# Patient Record
Sex: FEMALE | Race: WHITE | URBAN - METROPOLITAN AREA
[De-identification: names, ages, dates, MRNs, and addresses within clinical notes are randomized per-mention and may not be internally consistent; named-entity substitution may affect disease eponyms.]

---

## 2018-09-19 ENCOUNTER — OFFICE VISIT (OUTPATIENT)
Dept: DERMATOLOGY | Facility: CLINIC | Age: 23
End: 2018-09-19
Payer: COMMERCIAL

## 2018-09-19 DIAGNOSIS — L20.89 FLEXURAL ATOPIC DERMATITIS: Primary | ICD-10-CM

## 2018-09-19 RX ORDER — BETAMETHASONE DIPROPIONATE 0.05 %
OINTMENT (GRAM) TOPICAL 2 TIMES DAILY
Qty: 50 G | Refills: 1 | Status: SHIPPED | OUTPATIENT
Start: 2018-09-19

## 2018-09-19 RX ORDER — TACROLIMUS 1 MG/G
OINTMENT TOPICAL 2 TIMES DAILY
Qty: 60 G | Refills: 1 | Status: SHIPPED | OUTPATIENT
Start: 2018-09-19 | End: 2018-11-14

## 2018-09-19 RX ORDER — TRIAMCINOLONE ACETONIDE 1 MG/G
OINTMENT TOPICAL
COMMUNITY
Start: 2018-05-21

## 2018-09-19 ASSESSMENT — PAIN SCALES - GENERAL: PAINLEVEL: NO PAIN (0)

## 2018-09-19 NOTE — NURSING NOTE
Dermatology Rooming Note    Adelaida Phelps's goals for this visit include:   Chief Complaint   Patient presents with     Derm Problem     Janet is here today to be seen for excema.      Lois Patel MA

## 2018-09-19 NOTE — MR AVS SNAPSHOT
After Visit Summary   2018    Adelaida Phelps    MRN: [id number]           Patient Information     Date Of Birth          1995        Visit Information        Provider Department      2018 12:00 PM Nila Wright PA-C [facility]        Today's Diagnoses     Flexural atopic dermatitis    -  1       Follow-ups after your visit        Your next 10 appointments already scheduled     Sep 19, 2018 12:00 PM CDT   (Arrive by 11:45 AM)   [facility] Visit with [facility] ([facility])    [address] [place] [id number] [phone]            2018 10:45 AM CST   (Arrive by 10:30 AM)   Return Visit with [facility] ([facility])    [address] [place] [id number] [phone]              Who to contact     Please call your clinic at [phone] to:    Ask questions about your health    Make or cancel appointments    Discuss your medicines    Learn about your test results    Speak to your doctor            Additional Information About Your Visit        MyChart Information     [employer] is an electronic gateway that provides easy, online access to your medical records. With [employer], you can request a clinic appointment, read your test results, renew a prescription or communicate with your care team.     To sign up for Webchutne[employer] visit the website at [email or web address]   You will be asked to enter the access code listed below, as well as some personal information. Please follow the directions to create your username and password.     Your access code is: [email or web address]  Expires: 2018 10:54 AM     Your access code will  in 90 days. If you need help or a new code, please contact your [facility] or call [phone] for assistance.        Care EveryWhere ID     This  is your Care EveryWhere ID. This could be used by other organizations to access your [facility] medical records  [id number]         Blood Pressure from Last 3 Encounters:   No data found for BP    Weight from Last 3 Encounters:   No data found for Wt              Today, you had the following     No orders found for display         Today's Medication Changes          These changes are accurate as of 9/19/18 11:40 AM.  If you have any questions, ask your nurse or doctor.               Start taking these medicines.        Dose/Directions    betamethasone dipropionate 0.05 % ointment   Commonly known as:  DIPROSONE   Used for:  Flexural atopic dermatitis   Started by:  Nila Wright PA-C        Apply topically 2 times daily   Quantity:  50 g   Refills:  1       tacrolimus 0.1 % ointment   Commonly known as:  PROTOPIC   Used for:  Flexural atopic dermatitis   Started by:  Nila Wright PA-C        Apply topically 2 times daily   Quantity:  60 g   Refills:  1            Where to get your medicines      These medications were sent to [address] [address]     Phone:  [phone]     betamethasone dipropionate 0.05 % ointment    tacrolimus 0.1 % ointment                Primary Care Provider    None Specified       No primary provider on file.        Equal Access to Services     ELO CRESPO : Donny tamayo Sosonja, wawilliamda lunatashaadaha, qaybta kaalmada adeegyada, yudelka kinney. So [facility] [phone].    ATENCIÓN: Si habla español, tiene a cole disposición servicios gratuitos de asistencia lingüística. Llame al [phone].    We comply with applicable federal civil rights laws and [place] laws. We do not discriminate on the basis of race, color, national origin, age, disability, sex, sexual orientation, or gender identity.            Thank you!     Thank you for choosing [facility]  for your  care. Our goal is always to provide you with excellent care. Hearing back from our patients is one way we can continue to improve our services. Please take a few minutes to complete the written survey that you may receive in the mail after your visit with us. Thank you!             Your Updated Medication List - Protect others around you: Learn how to safely use, store and throw away your medicines at www.disposemymeds.org.          This list is accurate as of 9/19/18 11:40 AM.  Always use your most recent med list.                   Brand Name Dispense Instructions for use Diagnosis    betamethasone dipropionate 0.05 % ointment    DIPROSONE    50 g    Apply topically 2 times daily    Flexural atopic dermatitis       levonorgestrel 20 MCG/24HR IUD    MIRENA     1 each by Intrauterine route once        tacrolimus 0.1 % ointment    PROTOPIC    60 g    Apply topically 2 times daily    Flexural atopic dermatitis       triamcinolone 0.1 % ointment    KENALOG

## 2018-09-19 NOTE — LETTER
Date:September 21, 2018      Patient was self referred, no letter generated. Do not send.        [facility] Information

## 2018-09-19 NOTE — LETTER
9/19/2018       RE: Adelaida Phelps  [id number]  [place] [address]     Dear Colleague,    Thank you for referring your patient, Adelaida Phelps, to the [facility] DER[facility] at [facility]. Please see a copy of my visit note below.    [facility] Dermatology Note      Dermatology Problem List:  1. Atopic dermatitis - tacrolimus 0.1% ointment, betamethasone dipropionate 0.05% ointment, triamcinolone 0.1% ointment, hydrocortisone 1% cream OTC  - used in various areas on the body    Encounter Date: Sep 19, 2018    CC:  Chief Complaint   Patient presents with     Derm Problem     Janet is here today to be seen for excema.          History of Present Illness:  Ms. Adelaida Phelps is a 23 year old female who presents today for eczema evaluation. This is her first visit in our dermatology clinic. She was diagnosed with eczema at [place] a while ago. She was given triamcinolone in May. She feels like this helps, but does not completely resolve her more persistent patches. She has been dealing with this since childhood mostly on the elbows and behind the knees. Last December it started flaring up worse in areas it never showed up before, like on the tops of her hands. She does feel like sweat aggravates her skin. For skin care, she uses a Dove sensitive skin soap only on areas of her body that need it. She applies lotion after the shower. She only uses triamcinolone during flare ups. Denies history of asthma. She does have seasonal allergies. Denies history of eczema in her immediate family. She is otherwise healthy. 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       Past Medical History:   There is no problem list on file for this patient.    No past medical history on file.  No past surgical history on file.    Social History:  Social History     Social History     Marital status: Single     Spouse name: N/A     Number of children: N/A     Years of  education: N/A     Social History Main Topics     Smoking status: Not on file     Smokeless tobacco: Not on file     Alcohol use Not on file     Drug use: Not on file     Sexual activity: Not on file     Other Topics Concern     Not on file     Social History Narrative       Family History:  No family history on file.    Medications:  No current outpatient prescriptions on file.       Allergies   Allergen Reactions     Cats        Review of Systems:  -Skin Establ Pt: The patient denies any new rash, pruritus, or lesions that are symptomatic, changing or bleeding, except as per HPI.  -Constitutional: The patient is feeling generally well.  -Allergy: allergic to cats, mild hx of seasonal rhinitis  -Pulm: no hx asthma    Physical exam:  Vitals: There were no vitals taken for this visit.  GEN: This is a well developed, well-nourished female in no acute distress, in a pleasant mood.    SKIN: Total skin excluding the undergarment areas was performed. The exam included the head/face, neck, both arms, chest, back, abdomen, both legs, digits and/or nails.   - Mild erythema and scaly patches on the bilateral antecubital fossa, bilateral popliteal fossae, right thenar eminence, right wrist, and right third finger, right fourth finger, bilateral dorsal hand, and left fourth finger.  - Similar erythematous patches on the neck.    - No other lesions of concern on areas examined.       Impression/Plan:  1. Atopic dermatitis     Bacterial culture taken from right popliteal fossa.     Continue triamcinolone 0.1% ointment.     Start betamethasone dipropionate 0.05% ointment - apply topically BID during flares to elbows, knees, and hands and forearms - use if TAC is not enough.    Start tacrolimus 0.1% ointment - apply topically BID when not flared. Use this intermittently between doses of topical corticosteroids to help with steroid sparing    Discussed various strengths of steroids/topicals and which areas of the body they are best  for.    Recommended occlusion with saran wrap to help medications penetrate further on stubborn areas like popliteal and antecubital fossae.     Emphasized importance of moisturizing daily, up to TID, specifically Cetaphil/Cerave creams. Cold mist humidifiers were also recommended to keep skin moisturized.      Discussed possibility of starting NB-UVB therapy as next step if skin continue to get worse.       Follow-up in 2 months, earlier for new or changing lesions.      Staff Involved:    Scribe Disclosure  I, Monster Sams, am serving as a scribe to document services personally performed by Nila Wright PA-C, based on data collection and the provider's statements to me.     Provider Disclosure:   The documentation recorded by the scribe accurately reflects the services I personally performed and the decisions made by me.    All risks, benefits and alternatives were discussed with patient.  Patient is in agreement and understands the assessment and plan.  All questions were answered.    Nila Wright PA-C  [facility]: Phone: [phone], Fax: [phone]        Again, thank you for allowing me to participate in the care of your patient.      Sincerely,    Nila Wright PA-C

## 2018-09-19 NOTE — PROGRESS NOTES
[facility] Dermatology Note      Dermatology Problem List:  1. Atopic dermatitis - tacrolimus 0.1% ointment, betamethasone dipropionate 0.05% ointment, triamcinolone 0.1% ointment, hydrocortisone 1% cream OTC  - used in various areas on the body    Encounter Date: Sep 19, 2018    CC:  Chief Complaint   Patient presents with     Derm Problem     Janet is here today to be seen for excema.          History of Present Illness:  Ms. Adelaida Phelps is a 23 year old female who presents today for eczema evaluation. This is her first visit in our dermatology clinic. She was diagnosed with eczema at [place] a while ago. She was given triamcinolone in May. She feels like this helps, but does not completely resolve her more persistent patches. She has been dealing with this since childhood mostly on the elbows and behind the knees. Last December it started flaring up worse in areas it never showed up before, like on the tops of her hands. She does feel like sweat aggravates her skin. For skin care, she uses a Dove sensitive skin soap only on areas of her body that need it. She applies lotion after the shower. She only uses triamcinolone during flare ups. Denies history of asthma. She does have seasonal allergies. Denies history of eczema in her immediate family. She is otherwise healthy. She is a chemistry student at the  of .       Past Medical History:   There is no problem list on file for this patient.    No past medical history on file.  No past surgical history on file.    Social History:  Social History     Social History     Marital status: Single     Spouse name: N/A     Number of children: N/A     Years of education: N/A     Social History Main Topics     Smoking status: Not on file     Smokeless tobacco: Not on file     Alcohol use Not on file     Drug use: Not on file     Sexual activity: Not on file     Other Topics Concern     Not on file     Social History Narrative       Family  History:  No family history on file.    Medications:  No current outpatient prescriptions on file.       Allergies   Allergen Reactions     Cats        Review of Systems:  -Skin Establ Pt: The patient denies any new rash, pruritus, or lesions that are symptomatic, changing or bleeding, except as per HPI.  -Constitutional: The patient is feeling generally well.  -Allergy: allergic to cats, mild hx of seasonal rhinitis  -Pulm: no hx asthma    Physical exam:  Vitals: There were no vitals taken for this visit.  GEN: This is a well developed, well-nourished female in no acute distress, in a pleasant mood.    SKIN: Total skin excluding the undergarment areas was performed. The exam included the head/face, neck, both arms, chest, back, abdomen, both legs, digits and/or nails.   - Mild erythema and scaly patches on the bilateral antecubital fossa, bilateral popliteal fossae, right thenar eminence, right wrist, and right third finger, right fourth finger, bilateral dorsal hand, and left fourth finger.  - Similar erythematous patches on the neck.    - No other lesions of concern on areas examined.       Impression/Plan:  1. Atopic dermatitis     Bacterial culture taken from right popliteal fossa.     Continue triamcinolone 0.1% ointment.     Start betamethasone dipropionate 0.05% ointment - apply topically BID during flares to elbows, knees, and hands and forearms - use if TAC is not enough.    Start tacrolimus 0.1% ointment - apply topically BID when not flared. Use this intermittently between doses of topical corticosteroids to help with steroid sparing    Discussed various strengths of steroids/topicals and which areas of the body they are best for.    Recommended occlusion with saran wrap to help medications penetrate further on stubborn areas like popliteal and antecubital fossae.     Emphasized importance of moisturizing daily, up to TID, specifically Cetaphil/Cerave creams. Cold mist humidifiers were also recommended  to keep skin moisturized.      Discussed possibility of starting NB-UVB therapy as next step if skin continue to get worse.       Follow-up in 2 months, earlier for new or changing lesions.      Staff Involved:    Scribe Disclosure  I, Monster Sams, am serving as a scribe to document services personally performed by Nila Wright PA-C, based on data collection and the provider's statements to me.     Provider Disclosure:   The documentation recorded by the scribe accurately reflects the services I personally performed and the decisions made by me.    All risks, benefits and alternatives were discussed with patient.  Patient is in agreement and understands the assessment and plan.  All questions were answered.    Nila Wright PA-C  [facility]: Phone: [phone], Fax: [phone]

## 2018-09-22 LAB
BACTERIA SPEC CULT: ABNORMAL
BACTERIA SPEC CULT: ABNORMAL
Lab: ABNORMAL
SPECIMEN SOURCE: ABNORMAL

## 2018-09-25 DIAGNOSIS — B96.89 SUPERFICIAL BACTERIAL SKIN INFECTION: Primary | ICD-10-CM

## 2018-09-25 DIAGNOSIS — L08.9 SUPERFICIAL BACTERIAL SKIN INFECTION: Primary | ICD-10-CM

## 2018-09-25 RX ORDER — CLINDAMYCIN PHOSPHATE 10 UG/ML
LOTION TOPICAL 2 TIMES DAILY
Qty: 60 ML | Refills: 1 | Status: SHIPPED | OUTPATIENT
Start: 2018-09-25

## 2018-09-26 ENCOUNTER — HEALTH MAINTENANCE LETTER (OUTPATIENT)
Age: 23
End: 2018-09-26

## 2018-09-26 DIAGNOSIS — L20.89 FLEXURAL ATOPIC DERMATITIS: Primary | ICD-10-CM

## 2018-09-26 RX ORDER — PIMECROLIMUS 10 MG/G
CREAM TOPICAL 2 TIMES DAILY
Qty: 60 G | Refills: 1 | Status: SHIPPED | OUTPATIENT
Start: 2018-09-26 | End: 2018-11-14

## 2018-11-14 ENCOUNTER — OFFICE VISIT (OUTPATIENT)
Dept: DERMATOLOGY | Facility: CLINIC | Age: 23
End: 2018-11-14
Payer: COMMERCIAL

## 2018-11-14 DIAGNOSIS — L20.89 FLEXURAL ATOPIC DERMATITIS: Primary | ICD-10-CM

## 2018-11-14 ASSESSMENT — PAIN SCALES - GENERAL: PAINLEVEL: MILD PAIN (2)

## 2018-11-14 NOTE — MR AVS SNAPSHOT
After Visit Summary   11/14/2018    Adelaida Phelps    MRN: [id number]           Patient Information     Date Of Birth          1995        Visit Information        Provider Department      11/14/2018 10:45 AM Nila Wright PA-C [facility]        Today's Diagnoses     Flexural atopic dermatitis    -  1       Follow-ups after your visit        Follow-up notes from your care team     Return in about 6 weeks (around 12/26/2018).      Future tests that were ordered for you today     Open Standing Orders        Priority Remaining Interval Expires Ordered    PROCEDURE - PHOTOTHERAPY NBUVB Routine 99/99  11/14/2019 11/14/2018            Who to contact     Please call your clinic at [phone] to:    Ask questions about your health    Make or cancel appointments    Discuss your medicines    Learn about your test results    Speak to your doctor            Additional Information About Your Visit        MyChart Information     [employer] gives you secure access to your electronic health record. If you see a primary care provider, you can also send messages to your care team and make appointments. If you have questions, please call your primary care clinic.  If you do not have a primary care provider, please call [phone] and they will assist you.      [employer] is an electronic gateway that provides easy, online access to your medical records. With [employer], you can request a clinic appointment, read your test results, renew a prescription or communicate with your care team.     To access your existing account, please contact your [facility] or call [phone] for assistance.        Care EveryWhere ID     This is your Care EveryWhere ID. This could be used by other organizations to access your [facility] medical records  [id number]         Blood Pressure from Last 3 Encounters:   No data found for BP    Weight from Last 3 Encounters:   No data found for Wt                Primary Care Provider    None Specified       No primary provider on file.        Equal Access to Services     ELO CRESPO : Hadii aad ku hadsandranatanael Bass, catalina alexander, chasreina lozanomayudelka hugginssandermalina kinney. So [facility] [phone].    ATENCIÓN: Si habla español, tiene a cole disposición servicios gratuitos de asistencia lingüística. Llame al [phone].    We comply with applicable federal civil rights laws and [place] laws. We do not discriminate on the basis of race, color, national origin, age, disability, sex, sexual orientation, or gender identity.            Thank you!     Thank you for choosing [facility] DERMAT[facility]  for your care. Our goal is always to provide you with excellent care. Hearing back from our patients is one way we can continue to improve our services. Please take a few minutes to complete the written survey that you may receive in the mail after your visit with us. Thank you!             Your Updated Medication List - Protect others around you: Learn how to safely use, store and throw away your medicines at www.disposemymeds.org.          This list is accurate as of 11/14/18  3:59 PM.  Always use your most recent med list.                   Brand Name Dispense Instructions for use Diagnosis    betamethasone dipropionate 0.05 % ointment    DIPROSONE    50 g    Apply topically 2 times daily    Flexural atopic dermatitis       clindamycin 1 % lotion    CLEOCIN T    60 mL    Apply topically 2 times daily    Superficial bacterial skin infection       levonorgestrel 20 MCG/24HR IUD    MIRENA     1 each by Intrauterine route once        triamcinolone 0.1 % ointment    KENALOG

## 2018-11-14 NOTE — NURSING NOTE
"Dermatology Rooming Note    Adelaida Phelps's goals for this visit include:   Chief Complaint   Patient presents with     Derm Problem     Adelaida is here today for an eczema follow vamshi Son states \"it's coming back\".      Lois Patel, RMSHELIA     "

## 2018-11-14 NOTE — LETTER
"11/14/2018       RE: Adelaida Phelps  [address]  [facility] [address]     Dear Colleague,    Thank you for referring your patient, Adelaida Phelps, to the [facility] DERMATOLOGY at [facility]. Please see a copy of my visit note below.    [facility] Dermatology Note      Dermatology Problem List:  1. Atopic dermatitis - Start NB-UVB therapy, if insurance does not cover treatment then continue with betamethasone dipropionate 0.05% ointment, triamcinolone 0.1% ointment, hydrocortisone 1% cream OTC  - used in various areas on the   -Hold clindamycin for now      Encounter Date: Nov 14, 2018    CC:  Chief Complaint   Patient presents with     Derm Problem     Adelaida is here today for an eczema follow u- Adelaida states \"it's coming back\".          History of Present Illness:  Ms. Adelaida Phelps is a 23 year old female who presents as a follow-up for atopic dermatisis. The patient was last seen 9/19/18 when it was recommended she start tacrolimus 0.1% ointment, Betamethasone dipropionate 0.05% ointment, triamcinolone 0.1% ointment, and Hydrocotisone 1% cream. At today's visit the patient reports that her atopic dermatis has been slightly better, but it fluctuates. The patient has only been using the clindamycin and topical steroids. Due to insurance the patient was not able to purchase the protopic. The patient has been using dove gentle skin bar soap. The patient denies painful, itching, tingling or bleeding lesions unless otherwise noted.    Past Medical History:   There is no problem list on file for this patient.    History reviewed. No pertinent past medical history.  History reviewed. No pertinent surgical history.    Social History:   reports that she has never smoked. She has never used smokeless tobacco.    Family History:  History reviewed. No pertinent family history.    Medications:  Current Outpatient Prescriptions   Medication Sig Dispense " Refill     betamethasone dipropionate (DIPROSONE) 0.05 % ointment Apply topically 2 times daily 50 g 1     clindamycin (CLEOCIN T) 1 % lotion Apply topically 2 times daily 60 mL 1     levonorgestrel (MIRENA) 20 MCG/24HR IUD 1 each by Intrauterine route once       pimecrolimus (ELIDEL) 1 % cream Apply topically 2 times daily 60 g 1     tacrolimus (PROTOPIC) 0.1 % ointment Apply topically 2 times daily 60 g 1     triamcinolone (KENALOG) 0.1 % ointment          Allergies   Allergen Reactions     Cats        Review of Systems:  -Constitutional: The patient denies fatigue, fevers, chills, unintended weight loss, and night sweats.  -Skin: As above in HPI. No additional skin concerns.  -Allergy: allergic to cats, mild hx of seasonal rhinitis  -Pulm: no hx asthma    Physical exam:  Vitals: There were no vitals taken for this visit.  GEN: This is a well developed, well-nourished female in no acute distress, in a pleasant mood.    SKIN: Total skin excluding the undergarment areas was performed. The exam included the head/face, neck, both arms, chest, back, abdomen, both legs, digits and/or nails.   -There are numerous pink scaly patches and plaques on the bilateral forearms, chest, neck, stomach, back, hands, and legs. Face and scalp spared.  -No other lesions of concern on areas examined.     Impression/Plan:  1. Atopic Dermatis     Emphasized importance of moisturizing daily, up to TID, specifically Cetaphil/Cerave creams. Cold mist humidifiers were also recommended to keep skin moisturized.      Discussed MOA of NB-UVB therapy    Discussed the side effects and risks of NB-UVB therapy, consent signed     Starting NB-UVB therapy, will check if insurance will cover treatment prior to initiating     Hold Clindamycin 1 % lotion, apply topically 2 times daily, if the patient sees any areas that are getting infected then apply to specific areas.     Continue betamethasone dipropionate 0.05% ointment - apply topically BID during  flares to elbows, knees, and hands and forearms - use if TAC is not enough.    Continue tacrolimus 0.1% ointment - apply topically BID when not flared. Use this intermittently between doses of topical corticosteroids to help with steroid sparing  Will start phototherapy(nbUVB) protocol pending insurance approval, two to three times weekly, increasing dose as tolerated. Will initiate tx at 100mJ.  Maximum dose: 2000mJ  Consent form signed today. Medication list reviewed and no photosensitizing medication was noted.  Discussed all risks and side effects of phototherapy with patient, including burning, a worsening of her condition, scarring, pain, eye damage, photoaging and risk of skin cancer.  Will recheck patient in 4 weeks following initiating phototherpay     CC Dr. Alonso on close of this encounter.  Follow-up ~6weeks for new or changing lesions.       Staff Involved:    Scribe Disclosure  I, Binh Hollingsworth, am serving as a scribe to document services personally performed by Nila Wright PA-C, based on data collection and the provider's statements to me.     Provider Disclosure:   The documentation recorded by the scribe accurately reflects the services I personally performed and the decisions made by me.    All risks, benefits and alternatives were discussed with patient.  Patient is in agreement and understands the assessment and plan.  All questions were answered.    Nila Wright PA-C  [facility]: Phone: [phone], Fax: [phone]                    Again, thank you for allowing me to participate in the care of your patient.      Sincerely,    Nila Wright PA-C

## 2018-11-14 NOTE — PROGRESS NOTES
"[facility] Dermatology Note      Dermatology Problem List:  1. Atopic dermatitis - Start NB-UVB therapy, if insurance does not cover treatment then continue with betamethasone dipropionate 0.05% ointment, triamcinolone 0.1% ointment, hydrocortisone 1% cream OTC  - used in various areas on the   -Hold clindamycin for now      Encounter Date: Nov 14, 2018    CC:  Chief Complaint   Patient presents with     Derm Problem     Adelaida is here today for an eczema follow u- Adelaida states \"it's coming back\".          History of Present Illness:  Ms. Adelaida Phelps is a 23 year old female who presents as a follow-up for atopic dermatisis. The patient was last seen 9/19/18 when it was recommended she start tacrolimus 0.1% ointment, Betamethasone dipropionate 0.05% ointment, triamcinolone 0.1% ointment, and Hydrocotisone 1% cream. At today's visit the patient reports that her atopic dermatis has been slightly better, but it fluctuates. The patient has only been using the clindamycin and topical steroids. Due to insurance the patient was not able to purchase the protopic. The patient has been using dove gentle skin bar soap. The patient denies painful, itching, tingling or bleeding lesions unless otherwise noted.    Past Medical History:   There is no problem list on file for this patient.    History reviewed. No pertinent past medical history.  History reviewed. No pertinent surgical history.    Social History:   reports that she has never smoked. She has never used smokeless tobacco.    Family History:  History reviewed. No pertinent family history.    Medications:  Current Outpatient Prescriptions   Medication Sig Dispense Refill     betamethasone dipropionate (DIPROSONE) 0.05 % ointment Apply topically 2 times daily 50 g 1     clindamycin (CLEOCIN T) 1 % lotion Apply topically 2 times daily 60 mL 1     levonorgestrel (MIRENA) 20 MCG/24HR IUD 1 each by Intrauterine route once       pimecrolimus (ELIDEL) 1 " % cream Apply topically 2 times daily 60 g 1     tacrolimus (PROTOPIC) 0.1 % ointment Apply topically 2 times daily 60 g 1     triamcinolone (KENALOG) 0.1 % ointment          Allergies   Allergen Reactions     Cats        Review of Systems:  -Constitutional: The patient denies fatigue, fevers, chills, unintended weight loss, and night sweats.  -Skin: As above in HPI. No additional skin concerns.  -Allergy: allergic to cats, mild hx of seasonal rhinitis  -Pulm: no hx asthma    Physical exam:  Vitals: There were no vitals taken for this visit.  GEN: This is a well developed, well-nourished female in no acute distress, in a pleasant mood.    SKIN: Total skin excluding the undergarment areas was performed. The exam included the head/face, neck, both arms, chest, back, abdomen, both legs, digits and/or nails.   -There are numerous pink scaly patches and plaques on the bilateral forearms, chest, neck, stomach, back, hands, and legs. Face and scalp spared.  -No other lesions of concern on areas examined.     Impression/Plan:  1. Atopic Dermatis     Emphasized importance of moisturizing daily, up to TID, specifically Cetaphil/Cerave creams. Cold mist humidifiers were also recommended to keep skin moisturized.      Discussed MOA of NB-UVB therapy    Discussed the side effects and risks of NB-UVB therapy, consent signed     Starting NB-UVB therapy, will check if insurance will cover treatment prior to initiating     Hold Clindamycin 1 % lotion, apply topically 2 times daily, if the patient sees any areas that are getting infected then apply to specific areas.     Continue betamethasone dipropionate 0.05% ointment - apply topically BID during flares to elbows, knees, and hands and forearms - use if TAC is not enough.    Continue tacrolimus 0.1% ointment - apply topically BID when not flared. Use this intermittently between doses of topical corticosteroids to help with steroid sparing  Will start phototherapy(nbUVB) protocol  pending insurance approval, two to three times weekly, increasing dose as tolerated. Will initiate tx at 100mJ.  Maximum dose: 2000mJ  Consent form signed today. Medication list reviewed and no photosensitizing medication was noted.  Discussed all risks and side effects of phototherapy with patient, including burning, a worsening of her condition, scarring, pain, eye damage, photoaging and risk of skin cancer.  Will recheck patient in 4 weeks following initiating phototherpay     CC Dr. Alonso on close of this encounter.  Follow-up ~6weeks for new or changing lesions.       Staff Involved:    Scribe Disclosure  I, Binh Hollingsworth, am serving as a scribe to document services personally performed by Nila Wright PA-C, based on data collection and the provider's statements to me.     Provider Disclosure:   The documentation recorded by the scribe accurately reflects the services I personally performed and the decisions made by me.    All risks, benefits and alternatives were discussed with patient.  Patient is in agreement and understands the assessment and plan.  All questions were answered.    Nila Wright PA-C  [facility]: Phone: [phone], Fax: [phone]

## 2018-11-14 NOTE — LETTER
Date:November 15, 2018      Patient was self referred, no letter generated. Do not send.        [facility] Health Information

## 2018-11-19 ENCOUNTER — ALLIED HEALTH/NURSE VISIT (OUTPATIENT)
Dept: DERMATOLOGY | Facility: CLINIC | Age: 23
End: 2018-11-19
Payer: COMMERCIAL

## 2018-11-19 DIAGNOSIS — L20.89 FLEXURAL ATOPIC DERMATITIS: ICD-10-CM

## 2018-11-19 NOTE — MR AVS SNAPSHOT
After Visit Summary   11/19/2018    Adelaida Phelps    MRN: [id number]           Patient Information     Date Of Birth          1995        Visit Information        Provider Department      11/19/2018 11:00 AM [facility] DERM LIGHT TREATMENT [facility] Dermatology        Today's Diagnoses     Flexural atopic dermatitis           Follow-ups after your visit        Your next 10 appointments already scheduled     Nov 26, 2018 11:00 AM CST   (Arrive by 10:45 AM)   Light Treatment Visit with [facility] DERM LIGHT TREATMENT   [facility] Dermatology ([facility])    [address] [address] [phone]              Who to contact     Please call your clinic at [phone] to:    Ask questions about your health    Make or cancel appointments    Discuss your medicines    Learn about your test results    Speak to your doctor            Additional Information About Your Visit        [employer] Information     [employer] gives you secure access to your electronic health record. If you see a primary care provider, you can also send messages to your care team and make appointments. If you have questions, please call your primary care clinic.  If you do not have a primary care provider, please call [phone] and they will assist you.      [employer] is an electronic gateway that provides easy, online access to your medical records. With [employer], you can request a clinic appointment, read your test results, renew a prescription or communicate with your care team.     To access your existing account, please contact your [facility] or call [phone] for assistance.        Care EveryWhere ID     This is your Care EveryWhere ID. This could be used by other organizations to access your [place] medical records  [id number]         Blood Pressure from Last 3 Encounters:   No data found for BP    Weight from Last 3 Encounters:   No data found for Wt               We Performed the Following     CHARGE - PHOTOTHERAPY [facility] (UV LIGHT)     PROCEDURE - PHOTOTHERAPY [facility]        Primary Care Provider    None Specified       No primary provider on file.        Equal Access to Services     ELO CRESPO : Donny Bass, catalina alexander, chasreina retanarastajohn guerriercrystaljohn, yudelka mayain hayaabarry guerrierzenia jones cordell kinney. So [facility] [phone].    ATENCIÓN: Si habla español, tiene a cole disposición servicios gratuitos de asistencia lingüística. Llame al [phone].    We comply with applicable federal civil rights laws and [place] laws. We do not discriminate on the basis of race, color, national origin, age, disability, sex, sexual orientation, or gender identity.            Thank you!     Thank you for choosing [facility] DERMATOLOGY  for your care. Our goal is always to provide you with excellent care. Hearing back from our patients is one way we can continue to improve our services. Please take a few minutes to complete the written survey that you may receive in the mail after your visit with us. Thank you!             Your Updated Medication List - Protect others around you: Learn how to safely use, store and throw away your medicines at www.disposemymeds.org.          This list is accurate as of 11/19/18 11:58 AM.  Always use your most recent med list.                   Brand Name Dispense Instructions for use Diagnosis    betamethasone dipropionate 0.05 % ointment    DIPROSONE    50 g    Apply topically 2 times daily    Flexural atopic dermatitis       clindamycin 1 % lotion    CLEOCIN T    60 mL    Apply topically 2 times daily    Superficial bacterial skin infection       levonorgestrel 20 MCG/24HR IUD    MIRENA     1 each by Intrauterine route once        triamcinolone 0.1 % ointment    KENALOG

## 2018-11-19 NOTE — NURSING NOTE
Adelaida Phelps comes into clinic today at the request of Nila Wright PA-C Ordering Provider for NBUVB.    This service provided today was under the supervising provider of the day Dr Moffett, who was available if needed.    Jessica Lowe RN

## 2018-11-19 NOTE — PROGRESS NOTES
[facility] Dermatology Phototherapy Record  1. Adelaida Phelps is a 23 year old female is here today for phototherapy (UVB) treatment for Flexural atopic dermatitis.        Changes or new medications since last treatment:   NO    New medical conditions or problems since last treatment:   NO    Any problems with last phototherapy treatment?    NO    2. The patient tolerated phototherapy without complication    Patient will return for next UVB treatment, per protocol.     Patient to see provider every 4-12 weeks for follow-up during treatment.      All questions and concerns discussed with patient in clinic today.      Jessica Lowe RN

## 2019-05-16 ENCOUNTER — OFFICE VISIT (OUTPATIENT)
Dept: FAMILY MEDICINE | Facility: CLINIC | Age: 24
End: 2019-05-16
Payer: COMMERCIAL

## 2019-05-16 VITALS
BODY MASS INDEX: 22.28 KG/M2 | DIASTOLIC BLOOD PRESSURE: 75 MMHG | HEART RATE: 81 BPM | SYSTOLIC BLOOD PRESSURE: 118 MMHG | HEIGHT: 62 IN | TEMPERATURE: 98 F | WEIGHT: 121.1 LBS | OXYGEN SATURATION: 97 % | RESPIRATION RATE: 16 BRPM

## 2019-05-16 DIAGNOSIS — S99.921A INJURY OF RIGHT FOOT, INITIAL ENCOUNTER: ICD-10-CM

## 2019-05-16 DIAGNOSIS — S01.01XA SCALP LACERATION, INITIAL ENCOUNTER: Primary | ICD-10-CM

## 2019-05-16 ASSESSMENT — PATIENT HEALTH QUESTIONNAIRE - PHQ9
5. POOR APPETITE OR OVEREATING: SEVERAL DAYS
SUM OF ALL RESPONSES TO PHQ QUESTIONS 1-9: 3

## 2019-05-16 ASSESSMENT — ANXIETY QUESTIONNAIRES
7. FEELING AFRAID AS IF SOMETHING AWFUL MIGHT HAPPEN: NOT AT ALL
IF YOU CHECKED OFF ANY PROBLEMS ON THIS QUESTIONNAIRE, HOW DIFFICULT HAVE THESE PROBLEMS MADE IT FOR YOU TO DO YOUR WORK, TAKE CARE OF THINGS AT HOME, OR GET ALONG WITH OTHER PEOPLE: NOT DIFFICULT AT ALL
3. WORRYING TOO MUCH ABOUT DIFFERENT THINGS: NOT AT ALL
2. NOT BEING ABLE TO STOP OR CONTROL WORRYING: NOT AT ALL
GAD7 TOTAL SCORE: 2
5. BEING SO RESTLESS THAT IT IS HARD TO SIT STILL: NOT AT ALL
1. FEELING NERVOUS, ANXIOUS, OR ON EDGE: NOT AT ALL
6. BECOMING EASILY ANNOYED OR IRRITABLE: SEVERAL DAYS

## 2019-05-16 ASSESSMENT — MIFFLIN-ST. JEOR: SCORE: 1268.91
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
## 2019-05-17 ASSESSMENT — ANXIETY QUESTIONNAIRES: GAD7 TOTAL SCORE: 2
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
## 2020-03-11 ENCOUNTER — HEALTH MAINTENANCE LETTER (OUTPATIENT)
Age: 25
End: 2020-03-11

## 2021-01-03 ENCOUNTER — HEALTH MAINTENANCE LETTER (OUTPATIENT)
Age: 26
End: 2021-01-03

## 2021-04-25 ENCOUNTER — HEALTH MAINTENANCE LETTER (OUTPATIENT)
Age: 26
End: 2021-04-25

## 2021-10-10 ENCOUNTER — HEALTH MAINTENANCE LETTER (OUTPATIENT)
Age: 26
End: 2021-10-10

## 2022-05-21 ENCOUNTER — HEALTH MAINTENANCE LETTER (OUTPATIENT)
Age: 27
End: 2022-05-21

## 2022-09-18 ENCOUNTER — HEALTH MAINTENANCE LETTER (OUTPATIENT)
Age: 27
End: 2022-09-18

## 2023-06-04 ENCOUNTER — HEALTH MAINTENANCE LETTER (OUTPATIENT)
Age: 28
End: 2023-06-04